# Patient Record
Sex: FEMALE | Race: WHITE | NOT HISPANIC OR LATINO | Employment: FULL TIME | URBAN - METROPOLITAN AREA
[De-identification: names, ages, dates, MRNs, and addresses within clinical notes are randomized per-mention and may not be internally consistent; named-entity substitution may affect disease eponyms.]

---

## 2023-10-16 PROBLEM — E66.812 CLASS 2 OBESITY WITH BODY MASS INDEX (BMI) OF 35.0 TO 35.9 IN ADULT: Status: ACTIVE | Noted: 2023-10-16

## 2024-07-18 PROBLEM — E66.812 CLASS 2 OBESITY WITH BODY MASS INDEX (BMI) OF 35.0 TO 35.9 IN ADULT: Status: RESOLVED | Noted: 2023-10-16 | Resolved: 2024-07-18

## 2024-07-18 PROBLEM — N97.0 INFERTILITY ASSOCIATED WITH ANOVULATION: Status: ACTIVE | Noted: 2024-07-18

## 2024-07-18 PROBLEM — E66.9 CLASS 2 OBESITY WITH BODY MASS INDEX (BMI) OF 35.0 TO 35.9 IN ADULT: Status: RESOLVED | Noted: 2023-10-16 | Resolved: 2024-07-18

## 2024-07-18 PROBLEM — O99.019 ANEMIA IN PREGNANCY (HHS-HCC): Status: RESOLVED | Noted: 2023-10-16 | Resolved: 2024-07-18

## 2024-07-18 PROBLEM — E28.2 POLYCYSTIC OVARIAN SYNDROME: Status: ACTIVE | Noted: 2024-07-18

## 2024-09-17 ASSESSMENT — LIFESTYLE VARIABLES
TOBACCO_USE: NO
HISTORY_ALCOHOL_USE: NO

## 2024-09-17 NOTE — PROGRESS NOTES
Visit Type: In Person    NEW FERTILITY PATIENT VISIT    Referred by: Ascencion Bravo MD  Accompanied today by:        Kristi Jack is a 28 y.o.  female who presents with concerns regarding PCOS (diagnosed by oligomenorrhea and hirsutism). Reports that she was diagnosed last year. She was referred to endocrinology, but did not follow up.  Had persistent bleeding in  and early July, prior to that, period was in November. Periods are very spaced out, has gone up to 9 months without a period.      She has currently been TTC for the past two years.  Has never been treated specifically for PCOS.      Have you had any concerns about your fertility treatments so far? No treatments done  What are you goals for today's visit? Get answers and a plan  What causes of infertility have been identified on your workup so far? PCOS  Past Infertility Treatments: No  Please summarize your fertility treatments to date.     As far as you are aware, do you have insurance coverage for fertility diagnostic testing and/or fertility treatments? Yes    PRIOR EVALUATION / TREATMENT    Prior Labs  Lab Results    Date Done      AMH: No results found for requested labs within last 1825 days. No results found for requested labs within last 1825 days.   TSH: 3.07 (Ref range: 0.44 - 3.98 mIU/L) 10/17/2023   PRL: 5.2 (Ref range: 3.0 - 20.0 ug/L) 10/17/2023   Testosterone: Free 6.1, total 38 10/17/2023   DHEAS: 64 (L; Ref range: 65 - 395 ug/dL) 10/17/2023   FSH: 7.3 (Ref range: IU/L) 10/17/2023   HgbA1c: 4.8 (Ref range: see below %) 10/17/2023   Hepatitis B surface antigen: NONREACTIVE (Ref range: NONREACTIVE) 2020   Hepatitis C antibody: NONREACTIVE (Ref range: NONREACTIVE) 2020   HIV ½ Antigen Antibody screen with reflex: NONREACTIVE (Ref range: NONREACTIVE) 2020   Syphilis screening with reflex: No results found for requested labs within last 1825 days. 2021   GC: NEGATIVE (Ref range: Negative)  2020   CT: NEGATIVE (Ref range: Negative) 2020   Type and Screen: O 2021   Rh: POS 2021   Rubella: Equivocal (Ref range: ) 2020   Creatinine: 0.77 (Ref range: 0.50 - 1.05 mg/dL) 2021   AST:15 (Ref range: 9 - 39 U/L) 2021   ALT:10 (Ref range: 7 - 45 U/L): 2021   Relationship Status:      Have you ever been pregnant? Yes  How many times have you been pregnant? 1  Have you ever had a miscarriage? No  How many times have you had a miscarriage?       OB Hx   - 2021, spontaneous conception, full term, IOL at 38.5wga for concern for macrosomia, c/b shoulder dystocia (9 lbs, 10oz) and postpartum hemorrhage managed medically and treated with 2 units PRBCs; also gestational hypertension; discontinued breastfeeding 2022    GYN HISTORY   Have you ever been diagnosed with a sexually transmitted disease? No  Please select all that are applicable:    Have you ever had Pelvic Inflammatory Disease? No  Have you had an abnormal PAP smear? No  Date & Result of last PAP smear: 2020 NIL  Have you ever had an abnormal Mammogram? No  Date & result of your last mammogram:  never  Do you have pelvic pain? No  How many times per week do you have intercourse? 0-3  Do you have pain with intercourse? Deep penetration, Yes  Do you use lubricants with intercourse?    Do you have pain with bowel movements?Especially when having menses, Yes  Do you have pain with a full bladder? No    MENSTRUAL HISTORY  LMP: 2024  Menarche: I dont remember. Maybe 10 or 12 years old?  Contraception:  OCPs age 18-24  Cycle length:  Irregular  Describe your bleeding: Light usually, most recently Heavy  Dysmenorrhea: Yes    ENDOCRINE/INFERTILITY HISTORY  Duration of infertility: 1-5 years  Coital Activity/week: 0-3  Nipple Discharge: No  Vision changes: No  Headaches: Migraines without auras around menses Yes  Excess hair growth: Upper lip, eyebrows, Yes  Excessive hair loss: No  Acne: Yes  Oily skin:  Yes  Recent weight change  Weight gain: 9168-2280 gained 40lbs Yes  Weight loss: No  Exercise more than 3 times a week: Yes    PMH  Asthma    MEDICATIONS  Current Outpatient Medications on File Prior to Visit   Medication Sig Dispense Refill    ascorbic acid (Vitamin C) 500 mg ER capsule Take 1 capsule (500 mg) by mouth once daily.      cetirizine (ZyrTEC) 10 mg tablet ZyrTEC Allergy 10 MG Oral Tablet   Refills: 0        Start : 2020   Active      INOSITOL ORAL Take 1,000 mg by mouth once daily.      prenatal vitamin, iron-folic, (Prenatal) 27 mg iron-800 mcg folic acid tablet Take 1 tablet by mouth once daily.      saccharomyces boulardii (Florastor) 250 mg capsule Take 1 capsule (250 mg) by mouth once daily.       No current facility-administered medications on file prior to visit.      PSH  Past Surgical History:   Procedure Laterality Date    HUMERUS FRACTURE SURGERY Right 2023        PSYCH HISTORY  Have you ever been diagnosed with a mental health Issue? Yes, previously on wellbutrin but discontinued and doing well  Have you ever been hospitalized for a mental health disorder? No     SOCIAL HISTORY  Social History     Tobacco Use    Smoking status: Never     Passive exposure: Never    Smokeless tobacco: Never   Vaping Use    Vaping status: Never Used   Substance Use Topics    Alcohol use: Not Currently    Drug use: Never     Occupation:   Have you ever been incarcerated? No  Do you have a history of domestic violence? No  Do you feel safe at home? Yes  Do you have a history of any negative sexual experience such as incest or rape? No    PARTNER HISTORY  Partner Name: Scooter Jack  Partner : [id number]  Partner email:  [email or web address]  Occupation:    Prior fertility history:  no  PMH:  no  PSH:  no  Smoking:No  Alcohol Use: No  Drug Use: No  Medications:  none  Injuries: No  STD: No  Please select all that are applicable:    SA: No  SA Results:      FAMILY  "HISTORY  Family History   Problem Relation Name Age of Onset    Liver disease Father     Father with alcohol/substance use    CANCER HISTORY    Breast:  no  Ovarian:  no  Colon:  yes, great uncle  Endometrial:  no    FAMILY VTE HISTORY  Family History of Blood Clots:  no    GENETIC HISTORY  Ethnic Background  Patient: White  Partner: White  Genetic Disease in Family  Patient: No  Partner: No  Birth Defects in Family  Patient: No  Partner: yes, sister with heart murmur, doing well  Genetic screening performed previously:  no     BMI:   BMI Readings from Last 1 Encounters:   24 41.12 kg/m²     VITALS:  /78   Pulse 75   Ht 1.6 m (5' 3\")   Wt 105 kg (232 lb 2 oz)   LMP 2024   BMI 41.12 kg/m²     ASSESSMENT   28 y.o.  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  Partner SA: No Assessment    COUNSELING  We discussed causes of infertility including hormonal, egg quality issues, structural problems such as endometriosis, adhesions, or tubal problems, uterine factors such as polyps or fibroids, and sperm issues. Reviewed evaluation of such as well. We discussed various methods for achieving pregnancy in some detail including, ovulation induction, insemination, superovulation and IVF.    We discussed diagnosis of PCOS and implications for fertility and long-term health including risks of endometrial hyperplasia, obesity, diabetes and cardiovascular disease. Discussed diet and exercise are first-line treatments for PCOS. Medical management may be indicated, particularly for glucose intolerance if that is found in testing. Ovulation induction is the primary treatment for fertility.  Discussed the importance of diet and exercise as first-line treatments for PCOS and particularly the importance of a low-glycemic index diet that emphasizes whole grains, vegetables and fruits, and " protein sources while minimizing sugar and processed foods. Discussed that even a small amount of weight loss can have an effect on the symptoms of PCOS and is important for long-term health outcomes.      Routine Testing  Fertility Center  STDs Within 1 year   Genetic carrier Waiver/Completed   T&S Within 1 year   AMH Within 1 year   TSH Within 1 year   Rubella/Varicella Within 5 years     PLAN  Orders Placed This Encounter   Procedures    Hysterosalpingogram (HSG)    US pelvis transvaginal    FL hysterosalpingogram    Hemoglobin A1C    17-Hydroxyprogesterone    TSH with reflex to Free T4 if abnormal    Antimullerian Hormone (Amh)    Progesterone    Type And Screen    Rubella Antibody, Igg    Varicella Zoster Antibody, Igg    Hepatitis B surface antigen    Hepatitis C Antibody    HIV 1/2 Antigen/Antibody Screen with Reflex to Confirmation    Syphilis Screen with Reflex    C. Trachomatis / N. Gonorrhoeae, Amplified Detection    POCT pregnancy, urine manually resulted   Obtain above labs and imaging along with partner SA, likely PCOS and tentative plan will be OI/TIC: letrozole 5 mg on CD3-7 with OPKs and TIC x3    GENETIC SCREENING PATIENT  Declines at this time    PARTNER  Yes Semen Analysis: Ordered  NA    FOLLOW UP   Consults:  offered weight management consult, patient will consider and discuss at follow up visit  Chart to primary nurse for care coordination and patient check list/education  Enroll in Engaged MD  Take prenatal vitamins, vitamin D 2000 IUs daily  Discussed that pap and mammogram must be updated per ACOG guidelines before treatment can begin  Discussed that treatment cannot proceed until checklist items are complete   6 week follow up with Any provider  Additional testing for BMI < 18 or > 40: No-will discuss at follow up  Sperm Donor:      MD Completion:  Ectopic Risk: No  Medically Complex: No    Fertility Plan Update: Obtain above labs and imaging along with partner SA, likely PCOS and  tentative plan will be OI/TIC: letrozole 5 mg on CD3-7 with OPKs and TIC x3    Mayte Eli  09/18/2024  11:07 AM

## 2024-09-18 ENCOUNTER — CONSULT (OUTPATIENT)
Dept: ENDOCRINOLOGY | Facility: CLINIC | Age: 29
End: 2024-09-18
Payer: COMMERCIAL

## 2024-09-18 VITALS
DIASTOLIC BLOOD PRESSURE: 78 MMHG | BODY MASS INDEX: 41.13 KG/M2 | HEART RATE: 75 BPM | HEIGHT: 63 IN | WEIGHT: 232.13 LBS | SYSTOLIC BLOOD PRESSURE: 120 MMHG

## 2024-09-18 DIAGNOSIS — Z01.812 ENCOUNTER FOR PREPROCEDURAL LABORATORY EXAMINATION: ICD-10-CM

## 2024-09-18 DIAGNOSIS — N91.3 PRIMARY OLIGOMENORRHEA: ICD-10-CM

## 2024-09-18 DIAGNOSIS — N97.0 INFERTILITY ASSOCIATED WITH ANOVULATION: ICD-10-CM

## 2024-09-18 DIAGNOSIS — Z13.29 SCREENING FOR THYROID DISORDER: Primary | ICD-10-CM

## 2024-09-18 DIAGNOSIS — Z13.1 SCREENING FOR DIABETES MELLITUS: ICD-10-CM

## 2024-09-18 DIAGNOSIS — Z11.59 ENCOUNTER FOR SCREENING FOR OTHER VIRAL DISEASES: ICD-10-CM

## 2024-09-18 DIAGNOSIS — Z11.3 SCREENING FOR STDS (SEXUALLY TRANSMITTED DISEASES): ICD-10-CM

## 2024-09-18 DIAGNOSIS — E28.2 POLYCYSTIC OVARIAN SYNDROME: ICD-10-CM

## 2024-09-18 DIAGNOSIS — Z31.41 FERTILITY TESTING: ICD-10-CM

## 2024-09-18 DIAGNOSIS — Z01.83 ENCOUNTER FOR RH BLOOD TYPING: ICD-10-CM

## 2024-09-18 PROCEDURE — 99214 OFFICE O/P EST MOD 30 MIN: CPT | Performed by: STUDENT IN AN ORGANIZED HEALTH CARE EDUCATION/TRAINING PROGRAM

## 2024-09-18 ASSESSMENT — PATIENT HEALTH QUESTIONNAIRE - PHQ9
SUM OF ALL RESPONSES TO PHQ9 QUESTIONS 1 AND 2: 1
2. FEELING DOWN, DEPRESSED OR HOPELESS: NOT AT ALL
10. IF YOU CHECKED OFF ANY PROBLEMS, HOW DIFFICULT HAVE THESE PROBLEMS MADE IT FOR YOU TO DO YOUR WORK, TAKE CARE OF THINGS AT HOME, OR GET ALONG WITH OTHER PEOPLE: NOT DIFFICULT AT ALL
1. LITTLE INTEREST OR PLEASURE IN DOING THINGS: SEVERAL DAYS

## 2024-09-18 ASSESSMENT — COLUMBIA-SUICIDE SEVERITY RATING SCALE - C-SSRS
1. IN THE PAST MONTH, HAVE YOU WISHED YOU WERE DEAD OR WISHED YOU COULD GO TO SLEEP AND NOT WAKE UP?: NO
6. HAVE YOU EVER DONE ANYTHING, STARTED TO DO ANYTHING, OR PREPARED TO DO ANYTHING TO END YOUR LIFE?: NO
2. HAVE YOU ACTUALLY HAD ANY THOUGHTS OF KILLING YOURSELF?: NO

## 2024-09-18 ASSESSMENT — PAIN SCALES - GENERAL: PAINLEVEL: 0-NO PAIN

## 2024-09-23 ENCOUNTER — TELEPHONE (OUTPATIENT)
Dept: ENDOCRINOLOGY | Facility: CLINIC | Age: 29
End: 2024-09-23
Payer: COMMERCIAL

## 2024-09-23 NOTE — TELEPHONE ENCOUNTER
Called the patient she verified her name and date of birth I advised she can  a specimen cup for semen analysis in our office or get an urine cup at any pharmacy Last period was aug 5 I advised she can get labs and still no period then call to discuss results I advised hsg can not be done without a period I advised it is done day 5-11 of her cycle     CHARBEL THOMAS 09/23/24 11:42 AM

## 2024-09-24 ENCOUNTER — LAB (OUTPATIENT)
Dept: LAB | Facility: LAB | Age: 29
End: 2024-09-24
Payer: COMMERCIAL

## 2024-09-24 DIAGNOSIS — Z13.29 SCREENING FOR THYROID DISORDER: ICD-10-CM

## 2024-09-24 DIAGNOSIS — N91.3 PRIMARY OLIGOMENORRHEA: ICD-10-CM

## 2024-09-24 DIAGNOSIS — Z31.41 FERTILITY TESTING: ICD-10-CM

## 2024-09-24 DIAGNOSIS — Z13.1 SCREENING FOR DIABETES MELLITUS: ICD-10-CM

## 2024-09-24 DIAGNOSIS — E28.2 POLYCYSTIC OVARIAN SYNDROME: ICD-10-CM

## 2024-09-24 DIAGNOSIS — Z11.3 SCREENING FOR STDS (SEXUALLY TRANSMITTED DISEASES): ICD-10-CM

## 2024-09-24 DIAGNOSIS — Z11.59 ENCOUNTER FOR SCREENING FOR OTHER VIRAL DISEASES: ICD-10-CM

## 2024-09-24 DIAGNOSIS — Z01.83 ENCOUNTER FOR RH BLOOD TYPING: ICD-10-CM

## 2024-09-24 LAB
ABO GROUP (TYPE) IN BLOOD: NORMAL
ANTIBODY SCREEN: NORMAL
EST. AVERAGE GLUCOSE BLD GHB EST-MCNC: 105 MG/DL
HBA1C MFR BLD: 5.3 %
HBV SURFACE AG SERPL QL IA: NONREACTIVE
HCV AB SER QL: NONREACTIVE
HIV 1+2 AB+HIV1 P24 AG SERPL QL IA: NONREACTIVE
PROGEST SERPL-MCNC: 0.3 NG/ML
RH FACTOR (ANTIGEN D): NORMAL
RUBV IGG SERPL IA-ACNC: 0.7 IA
RUBV IGG SERPL QL IA: NEGATIVE
TREPONEMA PALLIDUM IGG+IGM AB [PRESENCE] IN SERUM OR PLASMA BY IMMUNOASSAY: NONREACTIVE
TSH SERPL-ACNC: 2.49 MIU/L (ref 0.44–3.98)
VARICELLA ZOSTER IGG INDEX: <0.2 IA
VZV IGG SER QL IA: NEGATIVE

## 2024-09-24 PROCEDURE — 86787 VARICELLA-ZOSTER ANTIBODY: CPT

## 2024-09-24 PROCEDURE — 87340 HEPATITIS B SURFACE AG IA: CPT

## 2024-09-24 PROCEDURE — 87491 CHLMYD TRACH DNA AMP PROBE: CPT

## 2024-09-24 PROCEDURE — 84443 ASSAY THYROID STIM HORMONE: CPT

## 2024-09-24 PROCEDURE — 84144 ASSAY OF PROGESTERONE: CPT

## 2024-09-24 PROCEDURE — 86317 IMMUNOASSAY INFECTIOUS AGENT: CPT

## 2024-09-24 PROCEDURE — 87389 HIV-1 AG W/HIV-1&-2 AB AG IA: CPT

## 2024-09-24 PROCEDURE — 36415 COLL VENOUS BLD VENIPUNCTURE: CPT

## 2024-09-24 PROCEDURE — 86850 RBC ANTIBODY SCREEN: CPT

## 2024-09-24 PROCEDURE — 86900 BLOOD TYPING SEROLOGIC ABO: CPT

## 2024-09-24 PROCEDURE — 86780 TREPONEMA PALLIDUM: CPT

## 2024-09-24 PROCEDURE — 83036 HEMOGLOBIN GLYCOSYLATED A1C: CPT

## 2024-09-24 PROCEDURE — 86803 HEPATITIS C AB TEST: CPT

## 2024-09-24 PROCEDURE — 87591 N.GONORRHOEAE DNA AMP PROB: CPT

## 2024-09-24 PROCEDURE — 83498 ASY HYDROXYPROGESTERONE 17-D: CPT

## 2024-09-24 PROCEDURE — 83516 IMMUNOASSAY NONANTIBODY: CPT

## 2024-09-24 PROCEDURE — 86901 BLOOD TYPING SEROLOGIC RH(D): CPT

## 2024-09-25 LAB
C TRACH RRNA SPEC QL NAA+PROBE: NEGATIVE
N GONORRHOEA DNA SPEC QL PROBE+SIG AMP: NEGATIVE

## 2024-09-28 LAB
17OHP SERPL-MCNC: 49.16 NG/DL
MIS SERPL-MCNC: 10.54 NG/ML (ref 0.4–16.02)

## 2024-10-08 ENCOUNTER — LAB (OUTPATIENT)
Dept: LAB | Facility: LAB | Age: 29
End: 2024-10-08
Payer: COMMERCIAL

## 2024-10-08 ENCOUNTER — TELEPHONE (OUTPATIENT)
Dept: ENDOCRINOLOGY | Facility: CLINIC | Age: 29
End: 2024-10-08
Payer: COMMERCIAL

## 2024-10-08 DIAGNOSIS — N97.9 FEMALE INFERTILITY: ICD-10-CM

## 2024-10-08 DIAGNOSIS — Z32.00 PREGNANCY EXAMINATION OR TEST, PREGNANCY UNCONFIRMED: ICD-10-CM

## 2024-10-08 LAB
B-HCG SERPL-ACNC: <3 MIU/ML
PROGEST SERPL-MCNC: <0.3 NG/ML

## 2024-10-08 PROCEDURE — 84702 CHORIONIC GONADOTROPIN TEST: CPT

## 2024-10-08 PROCEDURE — 84144 ASSAY OF PROGESTERONE: CPT

## 2024-10-08 PROCEDURE — 36415 COLL VENOUS BLD VENIPUNCTURE: CPT

## 2024-10-08 RX ORDER — MEDROXYPROGESTERONE ACETATE 10 MG/1
10 TABLET ORAL DAILY
Qty: 10 TABLET | Refills: 0 | Status: SHIPPED | OUTPATIENT
Start: 2024-10-08 | End: 2025-10-08

## 2024-10-08 NOTE — TELEPHONE ENCOUNTER
Returned patient's call. Patient stated LMP: 8/5/24, and her cycles can typically go Q 3-9 months. Patient met with Dr. Eli on 9/18 and is planning to get set up for OI/TIC cycles and will need to schedule HSG and pelvic US with withdrawal bleed.  Patient placed in noon huddle for today to review p4 and bhcg for possible provera start.     Ailyn De Leon 10/08/24 10:16 AM    HUDDLE PROVIDER NOTE- PROGESTERONE CHECK  Ultrasound and/or labs reviewed at huddle.   Progesterone <0.3, Negative HCG    Ovulatory progesterone  no    Next steps:  Plan for provera 10mg x 10 days.   Jocelyn Mathews  10/08/2024  2:41 PM

## 2024-10-08 NOTE — PROGRESS NOTES
Patient called today with LMP last on 8/5/24 and cycle length Q3-9 months. Patient had labs checked and p4 and bhcg negative (see Jocelyn's note from telephone call today). Plan to start provera today 10mg x 10 days and patient to call with withdrawal bleed to schedule HSG and pelvic.     Ailyn De Leon 10/08/24 2:46 PM

## 2024-10-08 NOTE — TELEPHONE ENCOUNTER
Reason for call: Patient never gets a cycle and was advised by Dr Eli to call to get a Rx to  start her cycle. She already did her labs.  Notes:

## 2024-11-01 ENCOUNTER — OFFICE VISIT (OUTPATIENT)
Dept: ENDOCRINOLOGY | Facility: CLINIC | Age: 29
End: 2024-11-01
Payer: COMMERCIAL

## 2024-11-01 VITALS
HEART RATE: 71 BPM | HEIGHT: 63 IN | BODY MASS INDEX: 41.66 KG/M2 | SYSTOLIC BLOOD PRESSURE: 117 MMHG | WEIGHT: 235.13 LBS | DIASTOLIC BLOOD PRESSURE: 80 MMHG

## 2024-11-01 DIAGNOSIS — E28.2 PCOS (POLYCYSTIC OVARIAN SYNDROME): Primary | ICD-10-CM

## 2024-11-01 PROCEDURE — 3008F BODY MASS INDEX DOCD: CPT | Performed by: STUDENT IN AN ORGANIZED HEALTH CARE EDUCATION/TRAINING PROGRAM

## 2024-11-01 PROCEDURE — 99214 OFFICE O/P EST MOD 30 MIN: CPT | Performed by: STUDENT IN AN ORGANIZED HEALTH CARE EDUCATION/TRAINING PROGRAM

## 2024-11-01 PROCEDURE — 1036F TOBACCO NON-USER: CPT | Performed by: STUDENT IN AN ORGANIZED HEALTH CARE EDUCATION/TRAINING PROGRAM

## 2024-11-01 ASSESSMENT — PAIN SCALES - GENERAL: PAINLEVEL_OUTOF10: 0-NO PAIN

## 2024-11-02 ENCOUNTER — TELEPHONE (OUTPATIENT)
Dept: ENDOCRINOLOGY | Facility: CLINIC | Age: 29
End: 2024-11-02
Payer: COMMERCIAL

## 2024-11-25 ENCOUNTER — TELEPHONE (OUTPATIENT)
Dept: ENDOCRINOLOGY | Facility: CLINIC | Age: 29
End: 2024-11-25
Payer: COMMERCIAL

## 2024-11-25 NOTE — TELEPHONE ENCOUNTER
Reason for call: Medication request  Medication requested: Provera and Letrozole  Notes: Pt stated she was supposed to start her cycle Friday 11/22 and was instructed to call for medication if she did not. Pt would like the scripts sent to [facility] in [place].

## 2024-11-25 NOTE — TELEPHONE ENCOUNTER
Called the patient she verified her name and date of birth Patient will call back    CHARBEL THOMAS 11/25/24 12:18 PM

## 2024-11-25 NOTE — TELEPHONE ENCOUNTER
Called the patient she verified her name and date of birth No period since Oct 25 No fertility meds taken I advised to wait until Friday if no period call the office Patient verbalized an understanding    CHARBEL THOMAS 11/25/24 2:04 PM

## 2024-12-02 ENCOUNTER — LAB (OUTPATIENT)
Dept: LAB | Facility: LAB | Age: 29
End: 2024-12-02
Payer: COMMERCIAL

## 2024-12-02 DIAGNOSIS — N91.2 AMENORRHEA: Primary | ICD-10-CM

## 2024-12-02 DIAGNOSIS — N91.2 AMENORRHEA: ICD-10-CM

## 2024-12-02 LAB — B-HCG SERPL-ACNC: <3 MIU/ML

## 2024-12-02 PROCEDURE — 84144 ASSAY OF PROGESTERONE: CPT

## 2024-12-02 PROCEDURE — 36415 COLL VENOUS BLD VENIPUNCTURE: CPT

## 2024-12-02 PROCEDURE — 84702 CHORIONIC GONADOTROPIN TEST: CPT

## 2024-12-02 NOTE — TELEPHONE ENCOUNTER
Reason for call:   Notes: Patient hasn't started cycle yet and would like to speak with a nurse    20-Nov-2023 05:00

## 2024-12-02 NOTE — TELEPHONE ENCOUNTER
Returned patient call. Patient LMP = 10/25/2024. HCG and P4 labs pended to ANDREA Banks. Patient will like to start provera and Letrozole. Informed patient she would not receive both medications at the same time. Patient instructed to call CD1 in order to receive Letrozole. Patient verbalized understand, all questions answered at this time.  viktor grijalva 12/02/24 11:38 AM

## 2024-12-04 LAB — PROGEST SERPL-MCNC: <0.3 NG/ML

## 2024-12-04 NOTE — PROGRESS NOTES
Patient presents today for Patient LMP = 10/25/2024. HCG and P4 with hopes of starting provera.  viktor grijalva 12/04/24 12:02 PM    As of 3:47 pm patient labs have not resulted. Contacted  lab, stated results would be in in 15 minutes @ 3:00 pm. Patient added to noon huddle for 12/05/2024.  viktor grijalva 12/04/24 3:48 PM

## 2024-12-05 DIAGNOSIS — N91.2 AMENORRHEA: Primary | ICD-10-CM

## 2024-12-05 RX ORDER — MEDROXYPROGESTERONE ACETATE 10 MG/1
10 TABLET ORAL DAILY
Qty: 10 TABLET | Refills: 0 | Status: SHIPPED | OUTPATIENT
Start: 2024-12-05 | End: 2024-12-15

## 2024-12-05 NOTE — PROGRESS NOTES
Patient presents today for Patient LMP = 10/25/2024. HCG and P4 with hopes of starting provera. Current on CD 42  Component      Latest Ref Rng 12/2/2024   HCG, Beta-Quantitative      <5 mIU/mL <3    Progesterone      ng/mL <0.3    viktor grijalva 12/05/24 7:46 AM    HUDDLE PROVIDER NOTE- PROGESTERONE CHECK  Ultrasound and/or labs reviewed at huddle.   Results for orders placed or performed in visit on 12/02/24 (from the past 96 hours)   (Lab Collect) Human Chorionic Gonadotropin, Serum Quantitative   Result Value Ref Range    HCG, Beta-Quantitative <3 <5 mIU/mL   (Lab Collect) Progesterone   Result Value Ref Range    Progesterone <0.3 ng/mL       Ovulatory progesterone  no    Next steps:  Plan for provera 10mg x 10 days.  Jocelyn Mathews  12/05/2024  8:58 AM    Spoke with patient about above plan.  Patient verbalized understanding.  viktor grijalva 12/05/24 10:51 AM

## 2024-12-16 ENCOUNTER — TELEPHONE (OUTPATIENT)
Dept: ENDOCRINOLOGY | Facility: CLINIC | Age: 29
End: 2024-12-16
Payer: COMMERCIAL

## 2024-12-16 NOTE — TELEPHONE ENCOUNTER
Reason for call: reporting start of cycle  LMP: 12/15/24  Treatment type: timed intercourse  Note:

## 2024-12-16 NOTE — TELEPHONE ENCOUNTER
Returned patient call. Patient transferred to  to schedule vbpv.  viktor grijalva 12/16/24 2:45 PM

## 2024-12-17 ENCOUNTER — TELEMEDICINE (OUTPATIENT)
Dept: ENDOCRINOLOGY | Facility: CLINIC | Age: 29
End: 2024-12-17
Payer: COMMERCIAL

## 2024-12-17 VITALS — BODY MASS INDEX: 41.82 KG/M2 | WEIGHT: 236 LBS | HEIGHT: 63 IN

## 2024-12-17 DIAGNOSIS — N97.9 FEMALE INFERTILITY: Primary | ICD-10-CM

## 2024-12-17 DIAGNOSIS — Z31.41 FERTILITY TESTING: ICD-10-CM

## 2024-12-17 PROCEDURE — 3008F BODY MASS INDEX DOCD: CPT | Performed by: NURSE PRACTITIONER

## 2024-12-17 PROCEDURE — 99213 OFFICE O/P EST LOW 20 MIN: CPT | Performed by: NURSE PRACTITIONER

## 2024-12-17 PROCEDURE — 1036F TOBACCO NON-USER: CPT | Performed by: NURSE PRACTITIONER

## 2024-12-17 RX ORDER — LETROZOLE 2.5 MG/1
5 TABLET, FILM COATED ORAL DAILY
Qty: 10 TABLET | Refills: 0 | Status: SHIPPED | OUTPATIENT
Start: 2024-12-17 | End: 2025-03-17

## 2024-12-17 ASSESSMENT — COLUMBIA-SUICIDE SEVERITY RATING SCALE - C-SSRS
2. HAVE YOU ACTUALLY HAD ANY THOUGHTS OF KILLING YOURSELF?: NO
6. HAVE YOU EVER DONE ANYTHING, STARTED TO DO ANYTHING, OR PREPARED TO DO ANYTHING TO END YOUR LIFE?: NO
1. IN THE PAST MONTH, HAVE YOU WISHED YOU WERE DEAD OR WISHED YOU COULD GO TO SLEEP AND NOT WAKE UP?: NO

## 2024-12-17 ASSESSMENT — PAIN SCALES - GENERAL: PAINLEVEL_OUTOF10: 0-NO PAIN

## 2024-12-17 ASSESSMENT — PATIENT HEALTH QUESTIONNAIRE - PHQ9
2. FEELING DOWN, DEPRESSED OR HOPELESS: NOT AT ALL
SUM OF ALL RESPONSES TO PHQ9 QUESTIONS 1 AND 2: 0
1. LITTLE INTEREST OR PLEASURE IN DOING THINGS: NOT AT ALL

## 2024-12-17 NOTE — PROGRESS NOTES
Boarding Pass Oral TIC/IUI     Age: 28 y.o.    Provider: Mayte Eli MD  Primary RN: Barbara  Reasons for Treatment: Polycystic Ovarian Syndrome  Last BMI  24 : 41.65 kg/m²         Medical History        Past Medical History:   Diagnosis Date    Other allergy status, other than to drugs and biological substances       Environmental allergies    Pain in unspecified joint 10/09/2018     Arthralgia of multiple joints    Personal history of other diseases of the respiratory system       Personal history of asthma            Date Done Consultation Results/Comments   24 Medication Protocol    Fertility Plan Update:: Letrozole 5 mg with opk and TIC x3  P4 first cycle  Trigger plan:  none  Adjuncts:  none  Notes: Follow up in 4 months to discuss repeat SA HSG and TVUS     Procedure Order Placed []     Date Done Female Labs Results/Comments   2024 T&S (Q 1 Year) ABO: O  Rh: POS  Antibody: NEG      2024 Hep B sAg Nonreactive   2024 Hep C AB Nonreactive   2024 HIV Nonreactive   2024 Syphilis Nonreactive   2024 GC/CT GC: Negative  CT: Negative   2024 Rubella (Q 5 Years) Negative- Patient decline with review of risks today -PIPPA Varela     2024 Varicella (Q 5 Years) Negative- patient declines aware of potential risks and recommendation.    PIPPA Varela       Carrier Screening Myriad 2bP:   Declined waiver signed   N/A   Date Done Male Labs (required if IUI)    Results/Comments  Scooter Jack  MRN [id number]     Hep B sAg N/a     Hep C AB  N/a     HIV N/a     Syphilis N/a     GC/CT GC: n/a  CT: n/a     Carrier Screening    N/a  N/A     Semen Analysis  Volume(mL): 2.8  Count(million): 2.836  Motility(%): 50  Motile Count(million): 1.413   Date Done Miscellaneous Results/Comments                      MD Completion:  Ectopic Risk: No  Medically Complex: No    LMP 12/15/24    Letrozole: 2 tablet(s) a day, cycle days 3-7   Note: 1st day of full flow is  cycle day 1      Have sexual intercourse approximately every other day for 1 week beginning cycle day 11  You don't need temperature charts or LH predictor kits because normal cycle lengths indicate ovulatory cycles.  If you are planning inseminations, you will use LH predictor kits for timing    If normal 28 - 32 day cycle, repeat above for a total of 3 cycles    Call office if:   pregnant  cycles longer than 35 days   not pregnant after 3 regular cycles     Side effects of Letrozole may include:  acne  headache   hot flushes  leg cramps   nausea     If side effects are severe, alternative medications may be available. Letrozole has a 8-10% chance of twins and less than 1 % chance of triplets.     Progesterone:  May be given to bring on a period if you have not had a period after 40 days and a home pregnancy test is negative. Continue to take Progesterone if you have light bleeding. Expect to bleed within 2 weeks of finishing Progesterone    Letrozole is used for ovulation inductions. Letrozole is not a FDA approved medication for infertility treatment and may be associated with an increased for of congenital defects, although this was not found in a recent large study of patient taking letrozole for unexplained infertility. Letrozole is teratogenic therefore a urine pregnancy test should be taken prior to taking the medication.      Boarding pass reviewed with:  Protocol: Letrozole 5mg with cd 21 progesterone first cycle to confirm ovulation.Plan on 3 cycles and then FUV. Plan on progesterone on 1/3 first cycle.   Sperm: partner  Testing up to date. yes  Procedure order placed. N/a    Boarding pass approved for 3 cycles/until 9/25    Jocelyn CALDWELL Reid 12/17/24 1:24 PM    Addended Boarding Pass: Boarding Pass reviewed with ROSIE Barajas RN and is complete. patient is cleared for fertility treatment: Letrozole 7.5 mg days 3-7/TIC x 2 more cycles. Meera Bautista, BIANCA 02/07/25 3:27 PM

## 2025-01-03 ENCOUNTER — LAB (OUTPATIENT)
Dept: LAB | Facility: LAB | Age: 30
End: 2025-01-03
Payer: COMMERCIAL

## 2025-01-03 DIAGNOSIS — Z31.41 FERTILITY TESTING: ICD-10-CM

## 2025-01-03 LAB — PROGEST SERPL-MCNC: 0.4 NG/ML

## 2025-01-03 PROCEDURE — 84144 ASSAY OF PROGESTERONE: CPT

## 2025-01-03 NOTE — PROGRESS NOTES
29 year old patient of Mayte Eli MD  is here for current CD 20 ( lmp = 12/15/2024). Fertility Plan Update:: Letrozole 5 mg with opk and TIC with P4 first cycle.  viktor grijalva 01/03/25 8:23 AM      As of 11:00 am patient has not gone to lab. Contacted patient with a reminder call to have P4 draw. Detailed voicemail left for patient.  viktor grijalva 01/03/25 11:00 AM    As of 3:38 pm patient labs have not resulted. Message sent to  to add patient to noon huddle for Monday 01/06/2025.  viktor grijalva 01/03/25 3:38 PM

## 2025-01-06 DIAGNOSIS — N97.9 FEMALE INFERTILITY: ICD-10-CM

## 2025-01-06 RX ORDER — LETROZOLE 2.5 MG/1
7.5 TABLET, FILM COATED ORAL DAILY
Qty: 15 TABLET | Refills: 0 | Status: SHIPPED | OUTPATIENT
Start: 2025-01-06 | End: 2025-04-06

## 2025-01-06 NOTE — PROGRESS NOTES
29 year old patient of Mayte Eli MD  is here for current CD 20 ( lmp = 12/15/2024). Fertility Plan Update:: Letrozole 5 mg with opk and TIC with P4 first cycle.      Latest Reference Range & Units 01/03/25 11:45   Progesterone ng/mL 0.4   viktor grijalva 01/06/25 8:36 AM      HUDDLE PROVIDER NOTE- PROGESTERONE CHECK  [facility]   Results for orders placed or performed in visit on 01/03/25 (from the past 96 hours)   Progesterone   Result Value Ref Range    Progesterone 0.4 ng/mL       Ovulatory progesterone  no    Next steps:  Quick start letrozole 7.5mg x 5 days starting today. Plan to check a progesterone 2 weeks after last set of pills.   Jocelyn Mathews  01/06/2025  11:37 AM    Contacted patient about above plan. Letrozole 7.5 and P4 pended to ANDREA Banks. Patient verbalized understanding.  viktor grijalva 01/06/25 12:56 PM

## 2025-01-07 ENCOUNTER — TELEPHONE (OUTPATIENT)
Dept: ENDOCRINOLOGY | Facility: CLINIC | Age: 30
End: 2025-01-07
Payer: COMMERCIAL

## 2025-01-07 NOTE — TELEPHONE ENCOUNTER
Returned patient call. Left patient voicemail to return my call.  viktor grijalva 01/07/25 1:55 PM    Patient returned my call. Patient agreeable with plan. Patient will start step up 7.5 letrozole today for 5 days with a P4 lab draw on 01/23/2025 due to patient going out of town on the original date set. Message sent to  to change appointment on noon huddle to 01/23/2025.   viktor grijalva 01/07/25 2:18 PM

## 2025-01-07 NOTE — TELEPHONE ENCOUNTER
Reason for call: Patient spoke to a nurse yesterday got an Rx has some follow up questions. Please call her.  Notes:

## 2025-01-23 ENCOUNTER — LAB (OUTPATIENT)
Dept: LAB | Facility: LAB | Age: 30
End: 2025-01-23
Payer: COMMERCIAL

## 2025-01-23 DIAGNOSIS — N97.9 FEMALE INFERTILITY: ICD-10-CM

## 2025-01-23 LAB — PROGEST SERPL-MCNC: 4.1 NG/ML

## 2025-01-23 PROCEDURE — 84144 ASSAY OF PROGESTERONE: CPT

## 2025-01-23 NOTE — PROGRESS NOTES
"29 year old patient of Mayte Eli MD presents today for cd 19, patient will be out of town on cd21. Patient stepped up to 7.5 mg Letrozole on 01/07/2025. Fertility Plan Update:: Letrozole 7.5 mg with opk and TIC .  viktor grijalva 01/23/25 11:07 AM   Latest Reference Range & Units 01/23/25 09:07   Progesterone ng/mL 4.1     Monitoring patient, \"mock CD 19\" P4 Level for Step Up to Letrozole 7.5 mg/TIC cycle on 1-7-2025: P4 is ovulatory @ 4.1. Patient to call with menses or with + UPT. Plan to be communicated by LPN. Plan agreed upon by Dr. Eli. Meera Bautista, BIANCA 01/23/25 4:47 PM       Contacted patient with plan. Patient verbalized understanding.   viktor grijalva 01/23/25 3:41 PM    "

## 2025-02-07 ENCOUNTER — TELEPHONE (OUTPATIENT)
Dept: ENDOCRINOLOGY | Facility: CLINIC | Age: 30
End: 2025-02-07
Payer: COMMERCIAL

## 2025-02-07 DIAGNOSIS — N97.9 FEMALE INFERTILITY: ICD-10-CM

## 2025-02-07 RX ORDER — LETROZOLE 2.5 MG/1
7.5 TABLET, FILM COATED ORAL DAILY
Qty: 15 TABLET | Refills: 0 | Status: SHIPPED | OUTPATIENT
Start: 2025-02-07 | End: 2025-05-08

## 2025-02-07 NOTE — TELEPHONE ENCOUNTER
Patient called with menses for TIC cycle. LMP 2/7/25  Cycle #: 2  Medication: Letrozole 7.5  Ovulation: LH Kit  Sperm Source:  partner fresh  Approved donor sperm number: n/a  Luteal Support:  none  Additional Medications:  none  Boarding Pass signed off: Yes-12/17/24 with Jocelyn  IUI order pended: n/a-TIC  Additional Information: 1st cycle showed non-ovulatory p4 on CD21 with 5mg dose. Patient was stepped up to Letrozole 7.5mg. 7.5mg Letrozole order pended to provider for signing. Will request provider updates BP. Patient aware to take Letrozole starting CD 3 through CD 7 after negative UPT. Encouraged patient to call office with any questions or concerns.  Frida Barajas 02/07/25 2:30 PM

## 2025-02-07 NOTE — TELEPHONE ENCOUNTER
Reason for call: reporting start of cycle  LMP: 2/7/25  Treatment type: timed intercourse    Note:

## 2025-03-13 ENCOUNTER — TELEPHONE (OUTPATIENT)
Dept: ENDOCRINOLOGY | Facility: CLINIC | Age: 30
End: 2025-03-13

## 2025-03-13 DIAGNOSIS — Z32.01 POSITIVE URINE PREGNANCY TEST (HHS-HCC): Primary | ICD-10-CM

## 2025-03-13 NOTE — PROGRESS NOTES
Initial HCG: Patient called with + HPT  Patient's EVONNE Provider: Mayte Eli MD  Infertility dx: Polycystic Ovarian Syndrome  Achieved pregnancy by: Timed intercourse  Fertility medications used this cycle: letrozole  LMP: Patient's last menstrual period was 25  EGA based on (IUI date, ET ): LMP: 4w6d     Updated G/P with this pregnancy:   OB HX:                   OB History    Para Term  AB Living   1 1 1 0 0 1   SAB IAB Ectopic Multiple Live Births      0 0 0 0 1          # Outcome Date GA Lbr Shadi/2nd Weight Sex Type Anes PTL Lv   1 Term 21 38w0d     M Vag-Spont EPI N KATERINA      Complications: Shoulder Dystocia, Hemorrhage         Type & Screen: 2024  ABO: O  Rh: POS  Antibody: NEG  History of miscarriage: No  History of pelvic/abdominal surgeries: No  History of STDs/PID: No  Hx of ectopic: No  Hx of tubal disease/ blockage: No     Risk for ectopic: No        PNV: Yes  Vitamin D 2000 IUs: No  Luteal support: None  Additional medications: Vitamin C 500mg  Zyrtec 10mg  Inositol 1000mg ( recommend stopping with positive pregnancy test)     Labs ordered/Plan:   BhCG ordered. Team will reach out to patient with results and plan.   Discussed early pregnancy versus miscarriage versus ectopic. Precautions given.   Patient expresses understanding of plan and is agreeable.       Patient results are not back yet placed in noon huddle for tomorrow 3/14.

## 2025-03-13 NOTE — PROGRESS NOTES
Initial HCG: {EVONNE Initial Serum Quant:66549}  Patient's EVONNE Provider: Mayte Eli MD  Infertility dx: Polycystic Ovarian Syndrome  Achieved pregnancy by: Timed intercourse  Fertility medications used this cycle: letrozole  LMP: Patient's last menstrual period was 25  EGA based on (IUI date, ET ): LMP: 4w6d    Updated G/P with this pregnancy: ***  OB HX:  OB History    Para Term  AB Living   1 1 1 0 0 1   SAB IAB Ectopic Multiple Live Births   0 0 0 0 1      # Outcome Date GA Lbr Shadi/2nd Weight Sex Type Anes PTL Lv   1 Term 21 38w0d   M Vag-Spont EPI N KATERINA      Complications: Shoulder Dystocia, Hemorrhage       Type & Screen: 2024  ABO: O  Rh: POS  Antibody: NEG  History of miscarriage: No  History of pelvic/abdominal surgeries: No  History of STDs/PID: No  Hx of ectopic: No  Hx of tubal disease/ blockage: No    Risk for ectopic: No      PNV: Yes  Vitamin D 2000 IUs: No  Luteal support: None  Additional medications: Vitamin C 500mg  Zyrtec 10mg  Inositol 1000mg ( recommend stopping with positive pregnancy test)    Labs ordered/Plan:   BhCG ordered. Team will reach out to patient with results and plan.   Discussed early pregnancy versus miscarriage versus ectopic. Precautions given.   Patient expresses understanding of plan and is agreeable.    Candice Colon  2025  8:18 AM

## 2025-03-13 NOTE — TELEPHONE ENCOUNTER
Patient called for positive home pregnancy test. See noon huddle note for more details. Patient plans to go to outpatient lab for bHCG.      03/13/25 at 8:41 AM - Candice Colon LPN

## 2025-03-14 DIAGNOSIS — Z32.01 POSITIVE BLOOD PREGNANCY TEST (HHS-HCC): Primary | ICD-10-CM

## 2025-03-14 LAB — B-HCG SERPL-ACNC: 602 MIU/ML

## 2025-03-14 NOTE — PROGRESS NOTES
Initial HCG: Patient called with + HPT  Patient's EVONNE Provider: Mayte Eli MD  Infertility dx: Polycystic Ovarian Syndrome  Achieved pregnancy by: Timed intercourse  Fertility medications used this cycle: letrozole  LMP: Patient's last menstrual period was 25  EGA based on (IUI date, ET ): LMP: 4w6d     Updated G/P with this pregnancy:   OB HX:                                  OB History    Para Term  AB Living   1 1 1 0 0 1   SAB IAB Ectopic Multiple Live Births         0 0 0 0 1             # Outcome Date GA Lbr Shadi/2nd Weight Sex Type Anes PTL Lv   1 Term 21 38w0d     M Vag-Spont EPI N KATERINA      Complications: Shoulder Dystocia, Hemorrhage         Type & Screen: 2024  ABO: O  Rh: POS  Antibody: NEG  History of miscarriage: No  History of pelvic/abdominal surgeries: No  History of STDs/PID: No  Hx of ectopic: No  Hx of tubal disease/ blockage: No     Risk for ectopic: No        PNV: Yes  Vitamin D 2000 IUs: No  Luteal support: None  Additional medications: Vitamin C 500mg  Zyrtec 10mg  Inositol 1000mg ( recommend stopping with positive pregnancy test)     Labs ordered/Plan:   BhCG ordered. Team will reach out to patient with results and plan.   Discussed early pregnancy versus miscarriage versus ectopic. Precautions given.   Patient expresses understanding of plan and is agreeable.       Component      Latest Ref Rng 3/13/2025   HCG, TOTAL, QN      mIU/mL 602 (H)       Monitoring patient: Letrozole 7.5 mg/TIC cycle, LMP on 2025, 4.6 wks yesterday, initial HCG Level  appropriate at 602. Patient to repeat HCG Level in 1 week and continue current medication protocol. Plan to be communicated by RN. Plan agreed upon by Dr. Eli. Meera Bautista, CNP 25 7:22 AM       Phone call to patient to discuss results of blood pregnancy test. Shared with patient that HCG level is 602 and they are 4 weeks and 6 days pregnant. Reviewed with patient that we are cautiously optimistic that  this is a good level. Reviewed with patient to continue all current medications and that we will plan to repeat level in 1 week. Reviewed with patient to reach out to their primary OBGYN to schedule new OB visit at this time as patient needs to be seen by 9 weeks gestation. She verbalizes understanding and is agreeable.    Ailyn De Leon 03/14/25 8:12 AM

## 2025-03-20 ENCOUNTER — LAB REQUISITION (OUTPATIENT)
Dept: LAB | Facility: HOSPITAL | Age: 30
End: 2025-03-20
Payer: COMMERCIAL

## 2025-03-20 DIAGNOSIS — Z32.01 ENCOUNTER FOR PREGNANCY TEST, RESULT POSITIVE (HHS-HCC): ICD-10-CM

## 2025-03-20 DIAGNOSIS — O36.80X0 ENCOUNTER TO DETERMINE FETAL VIABILITY OF PREGNANCY, NOT APPLICABLE OR UNSPECIFIED FETUS: ICD-10-CM

## 2025-03-20 LAB — B-HCG SERPL-ACNC: 6125 MIU/ML

## 2025-03-20 PROCEDURE — 36415 COLL VENOUS BLD VENIPUNCTURE: CPT

## 2025-03-20 PROCEDURE — 84702 CHORIONIC GONADOTROPIN TEST: CPT

## 2025-03-20 NOTE — PROGRESS NOTES
Repeat HCst  on 3-  Patient's EVONNE Provider: Mayte Eli MD  Infertility dx: Polycystic Ovarian Syndrome  Achieved pregnancy by: Timed intercourse  Fertility medications used this cycle: letrozole  LMP: Patient's last menstrual period was 25  EGA based on (IUI date, ET ): LMP: 5w6d     Updated G/P with this pregnancy:   OB HX:                                  OB History    Para Term  AB Living   1 1 1 0 0 1   SAB IAB Ectopic Multiple Live Births         0 0 0 0 1             # Outcome Date GA Lbr Shadi/2nd Weight Sex Type Anes PTL Lv   1 Term 21 38w0d     M Vag-Spont EPI N KATERINA      Complications: Shoulder Dystocia, Hemorrhage         Type & Screen: 2024  ABO: O  Rh: POS  Antibody: NEG  History of miscarriage: No  History of pelvic/abdominal surgeries: No  History of STDs/PID: No  Hx of ectopic: No  Hx of tubal disease/ blockage: No     Risk for ectopic: No        PNV: Yes  Vitamin D 2000 IUs: No  Luteal support: None  Additional medications: Vitamin C 500mg  Zyrtec 10mg  Inositol 1000mg ( recommend stopping with positive pregnancy test)     Patient going to outpatient lab for repeat bhcg following letrozole TIC cycle. Based off LMP patient would be 5w6d.    25 at 7:56 AM - Candice Colon LPN      Component      Latest Ref Rng 3/13/2025 3/20/2025   HCG, TOTAL, QN      mIU/mL 602 (H)     HCG, Beta-Quantitative      <5 mIU/mL  6,125 (H)       Monitoring patient: Letrozole 7.5 mg/TIC cycle, LMP on 2025, 6 wks, repeat HCG Level today with appropriate rise to 6,125 from 602 on 3-. Patient to do an OB Scan and continue current medication protocol. Plan to be communicated by RN. Plan agreed upon by Dr. Eli. Meera Bautista, BIANCA 25 4:48 PM     Called patient with results and plan to schedule OB scan for next Thursday. Patient aware FD is gone and will call  back tomorrow to schedule this.     Ailyn De Leon 25 4:32 PM

## 2025-03-27 ENCOUNTER — OFFICE VISIT (OUTPATIENT)
Dept: ENDOCRINOLOGY | Facility: CLINIC | Age: 30
End: 2025-03-27
Payer: COMMERCIAL

## 2025-03-27 ENCOUNTER — ANCILLARY PROCEDURE (OUTPATIENT)
Dept: ENDOCRINOLOGY | Facility: CLINIC | Age: 30
End: 2025-03-27
Payer: COMMERCIAL

## 2025-03-27 DIAGNOSIS — O36.80X0 ENCOUNTER TO DETERMINE FETAL VIABILITY OF PREGNANCY, NOT APPLICABLE OR UNSPECIFIED FETUS: ICD-10-CM

## 2025-03-27 DIAGNOSIS — O36.80X0 ENCOUNTER TO DETERMINE FETAL VIABILITY OF PREGNANCY, SINGLE OR UNSPECIFIED FETUS: Primary | ICD-10-CM

## 2025-03-27 PROCEDURE — 76817 TRANSVAGINAL US OBSTETRIC: CPT | Performed by: OBSTETRICS & GYNECOLOGY

## 2025-03-27 PROCEDURE — 99212 OFFICE O/P EST SF 10 MIN: CPT

## 2025-03-27 PROCEDURE — 99212 OFFICE O/P EST SF 10 MIN: CPT | Mod: 25

## 2025-03-27 PROCEDURE — 76817 TRANSVAGINAL US OBSTETRIC: CPT

## 2025-03-27 NOTE — PROGRESS NOTES
Visit Type: In Person  MD reviewed, Authorization status not noted.    OB Scan    Ectopic risk factor: no  PGTA/PGTM: no  Blood type: O+  Method of Conception: Letrozole 7.5 mg/TIC, LMP 2025, 7 wks    Reviewed results from OB ultrasound today and results reviewed with pt as follows:     OB Scan results:   Assessment LMP on 2025.  present. EMELIA: 2025. Gest. age 6 w + 6 d  Dating Date Details Gest. age EMELIA  LMP 2025: 6 w + 6 d, EMELIA 2025  Stated EMELIA 6 w + 6 d, EMELIA 2025  U/S 3/27/2025 based upon CRL 6 w + 4 d, EMELIA 2025  Assessment Gestational sac: visualized. Location: intrauterine  Yolk sac: visualized  Embryo: visualized  Cardiac activity: present  Early placenta: circumferential  YS 2.2 mm   CRL 5.3 mm 6w 4d   bpm  Size = dates    Detailed discussion with patient about her scan results, pregnancy, and next steps:    Plan:   Reviewed medications in detail. She will continue PNV.   Discussed with patient any pregnancy concerns and discussed establishing care with an OB. Has new OB visit scheduled with Dr. Bravo 2025, will try to get in sooner with another OB provider, list of names given.   Will provide recommendations if she desires.   Advised to call with bleeding, pain or concerns. Denies any pain, bleeding, or cramping.   C/O nausea, able to keep food/fluids down. encouraged to try Vitamin B 6 25 mg p.o. TID and if worsens can add in 1/2 Unisom tablet (12.5 mg). Patient to call back if without relief.       Ultrasound results and Plan of care reviewed with Dr. Dickerson    Intimate Exam Performed: No, an intimate exam was not performed at this encounter.     Meera Bautista CNP 25 1:32 PM

## 2025-04-30 PROBLEM — E55.9 VITAMIN D DEFICIENCY: Status: RESOLVED | Noted: 2023-10-16 | Resolved: 2025-04-30

## 2025-04-30 PROBLEM — Z87.59 HISTORY OF SHOULDER DYSTOCIA IN PRIOR PREGNANCY: Status: ACTIVE | Noted: 2025-04-30

## 2025-04-30 PROBLEM — Z87.59 HISTORY OF GESTATIONAL HYPERTENSION: Status: ACTIVE | Noted: 2025-04-30

## 2025-04-30 PROBLEM — N91.1 SECONDARY AMENORRHEA: Status: RESOLVED | Noted: 2023-10-16 | Resolved: 2025-04-30

## 2025-04-30 PROBLEM — Z87.59 HISTORY OF THIRD DEGREE PERINEAL LACERATION: Status: ACTIVE | Noted: 2025-04-30

## 2025-04-30 PROBLEM — G56.22 CUBITAL TUNNEL SYNDROME ON LEFT: Status: RESOLVED | Noted: 2023-10-16 | Resolved: 2025-04-30

## 2025-04-30 PROBLEM — Z87.59 HISTORY OF POSTPARTUM HEMORRHAGE: Status: ACTIVE | Noted: 2025-04-30

## 2025-04-30 PROBLEM — K21.9 GERD (GASTROESOPHAGEAL REFLUX DISEASE): Status: RESOLVED | Noted: 2023-10-16 | Resolved: 2025-04-30

## 2025-04-30 PROBLEM — M25.50 ARTHRALGIA OF MULTIPLE JOINTS: Status: RESOLVED | Noted: 2023-10-16 | Resolved: 2025-04-30

## 2025-04-30 PROBLEM — G56.01 CARPAL TUNNEL SYNDROME OF RIGHT WRIST: Status: RESOLVED | Noted: 2023-10-16 | Resolved: 2025-04-30

## 2025-04-30 PROBLEM — M65.4 DE QUERVAIN'S TENOSYNOVITIS: Status: RESOLVED | Noted: 2023-10-16 | Resolved: 2025-04-30

## 2025-04-30 PROBLEM — Z34.90 ENCOUNTER FOR PREGNANCY RELATED EXAMINATION, ANTEPARTUM: Status: ACTIVE | Noted: 2025-04-30

## 2025-05-01 ENCOUNTER — APPOINTMENT (OUTPATIENT)
Dept: LAB | Facility: HOSPITAL | Age: 30
End: 2025-05-01
Payer: COMMERCIAL

## 2025-05-01 ENCOUNTER — APPOINTMENT (OUTPATIENT)
Dept: OBSTETRICS AND GYNECOLOGY | Facility: CLINIC | Age: 30
End: 2025-05-01
Payer: COMMERCIAL

## 2025-05-01 VITALS — WEIGHT: 236 LBS | SYSTOLIC BLOOD PRESSURE: 124 MMHG | BODY MASS INDEX: 41.81 KG/M2 | DIASTOLIC BLOOD PRESSURE: 80 MMHG

## 2025-05-01 DIAGNOSIS — Z87.59 HISTORY OF POSTPARTUM HEMORRHAGE: ICD-10-CM

## 2025-05-01 DIAGNOSIS — Z87.59 HISTORY OF GESTATIONAL HYPERTENSION: ICD-10-CM

## 2025-05-01 DIAGNOSIS — Z87.59 HISTORY OF THIRD DEGREE PERINEAL LACERATION: ICD-10-CM

## 2025-05-01 DIAGNOSIS — Z87.59 HISTORY OF SHOULDER DYSTOCIA IN PRIOR PREGNANCY: ICD-10-CM

## 2025-05-01 DIAGNOSIS — Z34.90 ENCOUNTER FOR PREGNANCY RELATED EXAMINATION, ANTEPARTUM: Primary | ICD-10-CM

## 2025-05-01 LAB
ABO GROUP (TYPE) IN BLOOD: NORMAL
ANTIBODY SCREEN: NORMAL
ERYTHROCYTE [DISTWIDTH] IN BLOOD BY AUTOMATED COUNT: 12.9 % (ref 11.5–14.5)
HCT VFR BLD AUTO: 35.8 % (ref 36–46)
HGB BLD-MCNC: 11.9 G/DL (ref 12–16)
MCH RBC QN AUTO: 28.5 PG (ref 26–34)
MCHC RBC AUTO-ENTMCNC: 33.2 G/DL (ref 32–36)
MCV RBC AUTO: 86 FL (ref 80–100)
NRBC BLD-RTO: 0 /100 WBCS (ref 0–0)
PLATELET # BLD AUTO: 246 X10*3/UL (ref 150–450)
RBC # BLD AUTO: 4.17 X10*6/UL (ref 4–5.2)
REFLEX ADDED, ANEMIA PANEL: NORMAL
RH FACTOR (ANTIGEN D): NORMAL
WBC # BLD AUTO: 7.1 X10*3/UL (ref 4.4–11.3)

## 2025-05-01 PROCEDURE — 87626 HPV SEP HI-RSK TYP&POOL RSLT: CPT

## 2025-05-01 PROCEDURE — 88141 CYTOPATH C/V INTERPRET: CPT | Performed by: PATHOLOGY

## 2025-05-01 PROCEDURE — 86900 BLOOD TYPING SEROLOGIC ABO: CPT

## 2025-05-01 PROCEDURE — 87491 CHLMYD TRACH DNA AMP PROBE: CPT

## 2025-05-01 PROCEDURE — 85027 COMPLETE CBC AUTOMATED: CPT

## 2025-05-01 PROCEDURE — 86901 BLOOD TYPING SEROLOGIC RH(D): CPT

## 2025-05-01 PROCEDURE — 86850 RBC ANTIBODY SCREEN: CPT

## 2025-05-01 PROCEDURE — 83021 HEMOGLOBIN CHROMOTOGRAPHY: CPT

## 2025-05-01 PROCEDURE — 83020 HEMOGLOBIN ELECTROPHORESIS: CPT

## 2025-05-01 PROCEDURE — 88175 CYTOPATH C/V AUTO FLUID REDO: CPT

## 2025-05-01 PROCEDURE — 87591 N.GONORRHOEAE DNA AMP PROB: CPT

## 2025-05-01 PROCEDURE — 0500F INITIAL PRENATAL CARE VISIT: CPT | Performed by: OBSTETRICS & GYNECOLOGY

## 2025-05-01 RX ORDER — NAPROXEN SODIUM 220 MG/1
162 TABLET, FILM COATED ORAL DAILY
Qty: 180 TABLET | Refills: 2 | Status: SHIPPED | OUTPATIENT
Start: 2025-05-01 | End: 2026-05-01

## 2025-05-01 ASSESSMENT — EDINBURGH POSTNATAL DEPRESSION SCALE (EPDS)
I HAVE BEEN SO UNHAPPY THAT I HAVE HAD DIFFICULTY SLEEPING: NOT AT ALL
I HAVE BEEN SO UNHAPPY THAT I HAVE BEEN CRYING: NO, NEVER
TOTAL SCORE: 0
I HAVE FELT SCARED OR PANICKY FOR NO GOOD REASON: NO, NOT AT ALL
I HAVE LOOKED FORWARD WITH ENJOYMENT TO THINGS: AS MUCH AS I EVER DID
I HAVE BEEN ABLE TO LAUGH AND SEE THE FUNNY SIDE OF THINGS: AS MUCH AS I ALWAYS COULD
THE THOUGHT OF HARMING MYSELF HAS OCCURRED TO ME: NEVER
THINGS HAVE BEEN GETTING ON TOP OF ME: NO, I HAVE BEEN COPING AS WELL AS EVER
I HAVE FELT SAD OR MISERABLE: NO, NOT AT ALL
I HAVE BLAMED MYSELF UNNECESSARILY WHEN THINGS WENT WRONG: NO, NEVER
I HAVE BEEN ANXIOUS OR WORRIED FOR NO GOOD REASON: NO, NOT AT ALL

## 2025-05-01 NOTE — PROGRESS NOTES
Subjective   Patient ID [id number]   Kristi Jack is a 29 y.o.  at 11w5d with a working estimated date of delivery of 2025, by Last Menstrual Period who presents for an initial prenatal visit. This pregnancy is planned.    Her pregnancy is complicated by:  Conceived with Letrozole   H/o shoulder dystocia, GHTN and PPH    OB History    Para Term  AB Living   2 1 1 0 0 1   SAB IAB Ectopic Multiple Live Births   0 0 0 0 1      # Outcome Date GA Lbr Shadi/2nd Weight Sex Type Anes PTL Lv   2 Current            1 Term 21 39w4d   M Vag-Spont EPI N KATERINA      Complications: Shoulder Dystocia, Hemorrhage          Objective   Physical Exam     Expected Total Weight Gain: Could not be calculated   Pregravid BMI: Could not be calculated             Physical Exam  Constitutional:       Appearance: Normal appearance.   Genitourinary:      Vulva normal.   HENT:      Head: Atraumatic.   Eyes:      Extraocular Movements: Extraocular movements intact.      Conjunctiva/sclera: Conjunctivae normal.   Pulmonary:      Effort: Pulmonary effort is normal.   Abdominal:      Palpations: Abdomen is soft.   Musculoskeletal:         General: Normal range of motion.      Cervical back: Normal range of motion.   Neurological:      General: No focal deficit present.   Skin:     General: Skin is warm and dry.   Psychiatric:         Mood and Affect: Mood normal.       Assessment/Plan   Immunizations: Up to Date  Prenatal Labs ordered  Daily prenatal vitamins and BASA  First trimester screening and second trimester screening discussed. Patient decided to proceed with cfDNA screening.   Follow up in 4 weeks for return OB visit.

## 2025-05-02 ENCOUNTER — APPOINTMENT (OUTPATIENT)
Dept: LAB | Facility: HOSPITAL | Age: 30
End: 2025-05-02
Payer: COMMERCIAL

## 2025-05-02 LAB
EST. AVERAGE GLUCOSE BLD GHB EST-MCNC: 108 MG/DL
EST. AVERAGE GLUCOSE BLD GHB EST-SCNC: 6 MMOL/L
HBA1C MFR BLD: 5.4 %
HBV CORE AB SERPL QL IA: NORMAL
HBV SURFACE AB SERPL IA-ACNC: <5 MIU/ML
HBV SURFACE AG SERPL QL IA: NORMAL
HCV AB SERPL QL IA: NORMAL
HEMOGLOBIN A2: 3.1 % (ref 2–3.5)
HEMOGLOBIN A: 96.1 % (ref 95.8–98)
HEMOGLOBIN F: 0.8 % (ref 0–2)
HEMOGLOBIN IDENTIFICATION INTERPRETATION: NORMAL
HIV 1+2 AB+HIV1 P24 AG SERPL QL IA: NORMAL
PATH REVIEW-HGB IDENTIFICATION: NORMAL
RUBV IGG SERPL IA-ACNC: NORMAL
T PALLIDUM AB SER QL IA: NORMAL

## 2025-05-03 LAB
BACTERIA UR CULT: NORMAL
C TRACH RRNA SPEC QL NAA+PROBE: NEGATIVE
N GONORRHOEA DNA SPEC QL PROBE+SIG AMP: NEGATIVE

## 2025-05-05 ENCOUNTER — HOSPITAL ENCOUNTER (OUTPATIENT)
Dept: RADIOLOGY | Facility: HOSPITAL | Age: 30
Discharge: HOME | End: 2025-05-05
Payer: COMMERCIAL

## 2025-05-05 DIAGNOSIS — Z34.90 ENCOUNTER FOR PREGNANCY RELATED EXAMINATION, ANTEPARTUM: ICD-10-CM

## 2025-05-05 PROCEDURE — 76813 OB US NUCHAL MEAS 1 GEST: CPT

## 2025-05-05 PROCEDURE — 76813 OB US NUCHAL MEAS 1 GEST: CPT | Performed by: MEDICAL GENETICS

## 2025-05-05 PROCEDURE — 76801 OB US < 14 WKS SINGLE FETUS: CPT

## 2025-05-06 PROBLEM — Z28.39 RUBELLA NON-IMMUNE STATUS, ANTEPARTUM (HHS-HCC): Status: ACTIVE | Noted: 2025-05-06

## 2025-05-06 PROBLEM — O09.899 RUBELLA NON-IMMUNE STATUS, ANTEPARTUM (HHS-HCC): Status: ACTIVE | Noted: 2025-05-06

## 2025-05-06 LAB
EST. AVERAGE GLUCOSE BLD GHB EST-MCNC: 108 MG/DL
EST. AVERAGE GLUCOSE BLD GHB EST-SCNC: 6 MMOL/L
HBA1C MFR BLD: 5.4 %
HBV CORE AB SERPL QL IA: NORMAL
HBV SURFACE AB SERPL IA-ACNC: <5 MIU/ML
HBV SURFACE AG SERPL QL IA: NORMAL
HCV AB SERPL QL IA: NORMAL
HIV 1+2 AB+HIV1 P24 AG SERPL QL IA: NORMAL
RUBV IGG SERPL IA-ACNC: <0.9 INDEX
T PALLIDUM AB SER QL IA: NEGATIVE

## 2025-05-13 ENCOUNTER — TELEPHONE (OUTPATIENT)
Dept: OBSTETRICS AND GYNECOLOGY | Facility: CLINIC | Age: 30
End: 2025-05-13
Payer: COMMERCIAL

## 2025-05-13 NOTE — TELEPHONE ENCOUNTER
Spoke with mandy from [employer] and she gave her a fax number of [phone].    Kyleigh Painting MA

## 2025-05-13 NOTE — TELEPHONE ENCOUNTER
Kristi Jack called in stating that she called the lab because she hasn't received her results back for her Myraid testing and they told her that they have the tube but no paperwork for it. Patient wants to know if we can fax information over to them.    Kyleigh Painting MA

## 2025-05-14 LAB
CYTOLOGY CMNT CVX/VAG CYTO-IMP: NORMAL
HPV HR 12 DNA GENITAL QL NAA+PROBE: NEGATIVE
HPV HR GENOTYPES PNL CVX NAA+PROBE: NEGATIVE
HPV16 DNA SPEC QL NAA+PROBE: NEGATIVE
HPV18 DNA SPEC QL NAA+PROBE: NEGATIVE
LAB AP HPV GENOTYPE QUESTION: YES
LAB AP HPV HR: NORMAL
LAB AP PAP ADDITIONAL TESTS: NORMAL
LABORATORY COMMENT REPORT: NORMAL
LMP START DATE: NORMAL
PATH REPORT.TOTAL CANCER: NORMAL

## 2025-05-15 LAB
COMMENTS - MP RESULT TYPE: NORMAL
SCAN RESULT: NORMAL

## 2025-06-05 ENCOUNTER — APPOINTMENT (OUTPATIENT)
Dept: OBSTETRICS AND GYNECOLOGY | Facility: CLINIC | Age: 30
End: 2025-06-05
Payer: COMMERCIAL

## 2025-06-05 VITALS — DIASTOLIC BLOOD PRESSURE: 82 MMHG | WEIGHT: 237 LBS | BODY MASS INDEX: 41.98 KG/M2 | SYSTOLIC BLOOD PRESSURE: 119 MMHG

## 2025-06-05 DIAGNOSIS — Z34.90 ENCOUNTER FOR PREGNANCY RELATED EXAMINATION, ANTEPARTUM: ICD-10-CM

## 2025-06-05 DIAGNOSIS — Z28.39 RUBELLA NON-IMMUNE STATUS, ANTEPARTUM (HHS-HCC): Primary | ICD-10-CM

## 2025-06-05 DIAGNOSIS — Z87.59 HISTORY OF SHOULDER DYSTOCIA IN PRIOR PREGNANCY: ICD-10-CM

## 2025-06-05 DIAGNOSIS — O09.899 RUBELLA NON-IMMUNE STATUS, ANTEPARTUM (HHS-HCC): Primary | ICD-10-CM

## 2025-06-05 DIAGNOSIS — Z87.59 HISTORY OF GESTATIONAL HYPERTENSION: ICD-10-CM

## 2025-06-05 DIAGNOSIS — Z87.59 HISTORY OF THIRD DEGREE PERINEAL LACERATION: ICD-10-CM

## 2025-06-05 DIAGNOSIS — Z87.59 HISTORY OF POSTPARTUM HEMORRHAGE: ICD-10-CM

## 2025-06-05 PROCEDURE — 0501F PRENATAL FLOW SHEET: CPT | Performed by: OBSTETRICS & GYNECOLOGY

## 2025-06-06 ENCOUNTER — PATIENT MESSAGE (OUTPATIENT)
Dept: OBSTETRICS AND GYNECOLOGY | Facility: CLINIC | Age: 30
End: 2025-06-06
Payer: COMMERCIAL

## 2025-06-06 DIAGNOSIS — K21.00 GASTROESOPHAGEAL REFLUX DISEASE WITH ESOPHAGITIS WITHOUT HEMORRHAGE: Primary | ICD-10-CM

## 2025-06-09 RX ORDER — LANSOPRAZOLE 30 MG/1
30 CAPSULE, DELAYED RELEASE ORAL
Qty: 30 CAPSULE | Refills: 11 | Status: SHIPPED | OUTPATIENT
Start: 2025-06-09 | End: 2026-06-09

## 2025-06-30 NOTE — PROGRESS NOTES
Assessment/Plan   Problem List Items Addressed This Visit       Polycystic ovarian syndrome    Overview   +hirsutism, +oligomenorrhea  -Based on [place] criteria.  Discussed weight loss and optimizing weight prior to pregnancy.  Discussed insulin resistance and need for regular diabetes screening.  Declines metformin and referral to weight loss management.   -Aware of need for withdrawal bleed every 3 months for endometrial protection             BMI 40.0-44.9, adult (Multi) - Primary    Infertility associated with anovulation    Overview   PCOS  Referral to EVONNE  Declines referral to weight loss management.         History of shoulder dystocia in prior pregnancy    Overview   Discussed option for PLTCS, hoping for vaginal delivery.           Encounter for pregnancy related examination, antepartum    Overview     [x] Accepts Blood Products: Yes  [x] Initial BMI: 42  [x] Prenatal Labs: RNI, otherwise normal  [x] Last pap: 2025 Negative, negative HPV  [x] Genetic Screening:  rrcfDNA screen 46XX, NT wnl  [x] Fetal Sex: It's a girl!  [x] Baby ASA: Yes  [x] Pregnancy dated by: LMP c/w 6w US    [] Anatomy US:   [] 1hr GCT at 24-28wks:  [x] Rhogam: O Positive  [] Tdap Vaccine:  [] RSV (32-36 wks Sep-):   [] Flu Vaccine:    [] GBS at 36 - 37 wks:  [] Labor preferences:  [x] 39 weeks discussion of IOL vs. Expectant management: Strongly desires spontaneous labor  [] Mode of delivery ( anticipated ):   [] Breastfeeding:  [] Postpartum Birth control method:                  History of postpartum hemorrhage    Overview   Type and cross at delivery   after  received 2uPRBCs, IV iron         History of third degree perineal laceration    History of gestational hypertension    Overview    Diagnosed during admission for delivery         Rubella non-immune status, antepartum ([facility])    Overview   Vaccinate postpartum                 Reviewed s/sx of PTL, warning signs, fetal movement counts, and when to call  provider  Follow up in 4 weeks for a routine prenatal visit.    Barbara Ann, CHAO-SUDHIR    Subjective     Kristi Jack is a 29 y.o.  at 20w4d with a working estimated date of delivery of 2025, by Last Menstrual Period who presents for a routine prenatal visit.     Vaginal bleeding no  Leakage of fluid  no  Decreased fetal movements slight fluttering appropriate for GA  Contractions no      Postpartum Depression: Low Risk  (2025)    [place]  Depression Scale     Last EPDS Total Score: 0     Last EPDS Self Harm Result: Never       Prenatal Labs  Lab Results   Component Value Date    HGB 11.9 (L) 2025    HCT 35.8 (L) 2025     2025    ABO O 2025    LABRH POS 2025    NEISSGONOAMP Negative 2025    CHLAMTRACAMP Negative 2025    SYPHT Negative 2025    HEPBSAG NON-REACTIVE 2025    HIV1X2 NON-REACTIVE 2025    URINECULTURE SEE NOTE 2025    GLUC1P 100 2021       Education provided:    The Midwifery Service at [facility] has a Certified Nurse Midwife on call  who is available to discuss any pregnancy related concerns or urgent needs that cannot wait until the next business day.  Please call the office where you are seen at during the day.  On call numbers for after hours are listed below.    At any time you would like to tour our facilities, please call [phone] to set up a tour    If you are  (less than 37) weeks and experience:     More than 5 contractions in an 1 hour period   If you have fluid leaking from your vagina   Bleeding that is as heavy as a period   Decreased fetal movements or changes in your baby's movement after 24 weeks   A headache that does not go away with Tylenol or rest    If you full term (37 weeks or greater) and experience:     Contractions that are 5 minutes a part for 2 hours that you cannot walk or talk through   A gush of fluid from your vagina   Bleeding that is as  heavy as a period   Decrease fetal movements or changes in your baby's movements   A headache that does not go away with Tylenol or rest     During the weekday office hours please call the office you are normally seen at.    After hours please call:  For patients planning on birthing at [facility] or [facility]) and need to speak to the midwife on call:  [phone]    For patients planning on birthing at [facility] and need to speak to the the midwife on call:  [phone]      DO NOT USE QUIQ MESSAGES - THEY ARE NOT MONITORED 24/7

## 2025-07-01 ENCOUNTER — HOSPITAL ENCOUNTER (OUTPATIENT)
Dept: RADIOLOGY | Facility: CLINIC | Age: 30
Discharge: HOME | End: 2025-07-01
Payer: COMMERCIAL

## 2025-07-01 ENCOUNTER — APPOINTMENT (OUTPATIENT)
Dept: OBSTETRICS AND GYNECOLOGY | Facility: CLINIC | Age: 30
End: 2025-07-01
Payer: COMMERCIAL

## 2025-07-01 VITALS — DIASTOLIC BLOOD PRESSURE: 76 MMHG | BODY MASS INDEX: 42.51 KG/M2 | WEIGHT: 240 LBS | SYSTOLIC BLOOD PRESSURE: 122 MMHG

## 2025-07-01 DIAGNOSIS — Z87.59 HISTORY OF POSTPARTUM HEMORRHAGE: ICD-10-CM

## 2025-07-01 DIAGNOSIS — Z87.59 HISTORY OF GESTATIONAL HYPERTENSION: ICD-10-CM

## 2025-07-01 DIAGNOSIS — O09.899 RUBELLA NON-IMMUNE STATUS, ANTEPARTUM (HHS-HCC): ICD-10-CM

## 2025-07-01 DIAGNOSIS — Z87.59 HISTORY OF THIRD DEGREE PERINEAL LACERATION: ICD-10-CM

## 2025-07-01 DIAGNOSIS — Z87.59 HISTORY OF SHOULDER DYSTOCIA IN PRIOR PREGNANCY: ICD-10-CM

## 2025-07-01 DIAGNOSIS — Z28.39 RUBELLA NON-IMMUNE STATUS, ANTEPARTUM (HHS-HCC): ICD-10-CM

## 2025-07-01 DIAGNOSIS — E28.2 POLYCYSTIC OVARIAN SYNDROME: ICD-10-CM

## 2025-07-01 DIAGNOSIS — Z34.90 ENCOUNTER FOR PREGNANCY RELATED EXAMINATION, ANTEPARTUM: ICD-10-CM

## 2025-07-01 DIAGNOSIS — N97.0 INFERTILITY ASSOCIATED WITH ANOVULATION: ICD-10-CM

## 2025-07-01 PROCEDURE — 76811 OB US DETAILED SNGL FETUS: CPT

## 2025-07-01 PROCEDURE — 0501F PRENATAL FLOW SHEET: CPT | Performed by: ADVANCED PRACTICE MIDWIFE

## 2025-07-01 PROCEDURE — 76811 OB US DETAILED SNGL FETUS: CPT | Performed by: OBSTETRICS & GYNECOLOGY

## 2025-07-17 ENCOUNTER — HOSPITAL ENCOUNTER (OUTPATIENT)
Dept: RADIOLOGY | Facility: CLINIC | Age: 30
Discharge: HOME | End: 2025-07-17
Payer: COMMERCIAL

## 2025-07-17 DIAGNOSIS — Z34.90 ENCOUNTER FOR PREGNANCY RELATED EXAMINATION, ANTEPARTUM: ICD-10-CM

## 2025-07-17 PROCEDURE — 76816 OB US FOLLOW-UP PER FETUS: CPT

## 2025-07-17 PROCEDURE — 76816 OB US FOLLOW-UP PER FETUS: CPT | Performed by: OBSTETRICS & GYNECOLOGY

## 2025-08-04 ENCOUNTER — APPOINTMENT (OUTPATIENT)
Dept: OBSTETRICS AND GYNECOLOGY | Facility: CLINIC | Age: 30
End: 2025-08-04
Payer: COMMERCIAL

## 2025-08-04 VITALS — BODY MASS INDEX: 44.11 KG/M2 | WEIGHT: 249 LBS | DIASTOLIC BLOOD PRESSURE: 73 MMHG | SYSTOLIC BLOOD PRESSURE: 111 MMHG

## 2025-08-04 DIAGNOSIS — Z3A.25 25 WEEKS GESTATION OF PREGNANCY (HHS-HCC): ICD-10-CM

## 2025-08-04 DIAGNOSIS — Z34.90 ENCOUNTER FOR PREGNANCY RELATED EXAMINATION, ANTEPARTUM: Primary | ICD-10-CM

## 2025-08-04 PROCEDURE — 0501F PRENATAL FLOW SHEET: CPT | Performed by: ADVANCED PRACTICE MIDWIFE

## 2025-08-04 ASSESSMENT — EDINBURGH POSTNATAL DEPRESSION SCALE (EPDS)
I HAVE BEEN SO UNHAPPY THAT I HAVE BEEN CRYING: NO, NEVER
I HAVE FELT SAD OR MISERABLE: NO, NOT AT ALL
I HAVE LOOKED FORWARD WITH ENJOYMENT TO THINGS: AS MUCH AS I EVER DID
TOTAL SCORE: 1
THE THOUGHT OF HARMING MYSELF HAS OCCURRED TO ME: NEVER
THINGS HAVE BEEN GETTING ON TOP OF ME: NO, I HAVE BEEN COPING AS WELL AS EVER
I HAVE FELT SCARED OR PANICKY FOR NO GOOD REASON: NO, NOT AT ALL
I HAVE BLAMED MYSELF UNNECESSARILY WHEN THINGS WENT WRONG: NOT VERY OFTEN
I HAVE BEEN SO UNHAPPY THAT I HAVE HAD DIFFICULTY SLEEPING: NOT AT ALL
I HAVE BEEN ANXIOUS OR WORRIED FOR NO GOOD REASON: NO, NOT AT ALL
I HAVE BEEN ABLE TO LAUGH AND SEE THE FUNNY SIDE OF THINGS: AS MUCH AS I ALWAYS COULD

## 2025-08-04 ASSESSMENT — ENCOUNTER SYMPTOMS
PSYCHIATRIC NEGATIVE: 0
NEUROLOGICAL NEGATIVE: 0
CONSTITUTIONAL NEGATIVE: 0
ENDOCRINE NEGATIVE: 0
EYES NEGATIVE: 0
RESPIRATORY NEGATIVE: 0
MUSCULOSKELETAL NEGATIVE: 0
ALLERGIC/IMMUNOLOGIC NEGATIVE: 0
CARDIOVASCULAR NEGATIVE: 0
HEMATOLOGIC/LYMPHATIC NEGATIVE: 0
GASTROINTESTINAL NEGATIVE: 0

## 2025-08-04 NOTE — PROGRESS NOTES
Ob Follow-up  25     SUBJECTIVE      HPI: Kristi Jack is a 29 y.o.  at 25w3d here for RPNV.  She denies LOF, VB, ctxs, endorses fetal movement.      OBJECTIVE  Visit Vitals  /73   Wt 113 kg (249 lb)   LMP 2025   BMI 44.11 kg/m²   OB Status Pregnant   Smoking Status Never   BSA 2.24 m²      ASSESSMENT & PLAN    Kristi Jack is a 29 y.o.  at 25w3d here for the following concerns we addressed today:    Problem List Items Addressed This Visit       Encounter for pregnancy related examination, antepartum - Primary    Overview     [x] Accepts Blood Products: Yes  [x] Initial BMI: 42  [x] Prenatal Labs: RNI, otherwise normal  [x] Last pap: 2025 Negative, negative HPV  [x] Genetic Screening:  rrcfDNA screen 46XX, NT wnl  [x] Fetal Sex: It's a girl!  [x] Baby ASA: Yes  [x] Pregnancy dated by: LMP c/w 6w US    [x] Anatomy US: Normal  [] 1hr GCT at 24-28wks:  [x] Rhogam: O Positive  [] Tdap Vaccine:  [] RSV (32-36 wks Sep-):   [] Flu Vaccine:    [] GBS at 36 - 37 wks:  [] Labor preferences:  [x] 39 weeks discussion of IOL vs. Expectant management: Strongly desires spontaneous labor  [] Mode of delivery ( anticipated ):   [x] Breastfeeding:  [] Postpartum Birth control method:                  Relevant Orders    CBC Anemia Panel With Reflex,Pregnancy    Glucose, 1 Hour Screen, Pregnancy    Syphilis Screen with Reflex     Other Visit Diagnoses         25 weeks gestation of pregnancy ([facility])        Relevant Orders    CBC Anemia Panel With Reflex,Pregnancy    Glucose, 1 Hour Screen, Pregnancy    Syphilis Screen with Reflex              Orders Placed This Encounter   Procedures    CBC Anemia Panel With Reflex,Pregnancy     Standing Status:   Future     Number of Occurrences:   1     Expected Date:   2025     Expiration Date:   2026     Release result to [employer]:   Immediate     [facility] Carveout?:   CARVEOUT: SEND TO [facility]    Glucose, 1 Hour Screen, Pregnancy     Standing Status:   Future      Number of Occurrences:   1     Expected Date:   8/4/2025     Expiration Date:   8/4/2026     Release result to MyChart:   Immediate    Syphilis Screen with Reflex     Standing Status:   Future     Number of Occurrences:   1     Expected Date:   8/4/2025     Expiration Date:   8/4/2026     [facility]   Immediate [1]      Return precautions reviewed.   RTC in 2-4 weeks      CHAO Rojo-SUDHIR

## 2025-08-05 ENCOUNTER — APPOINTMENT (OUTPATIENT)
Dept: OBSTETRICS AND GYNECOLOGY | Facility: CLINIC | Age: 30
End: 2025-08-05
Payer: COMMERCIAL

## 2025-08-08 ENCOUNTER — APPOINTMENT (OUTPATIENT)
Dept: OBSTETRICS AND GYNECOLOGY | Facility: CLINIC | Age: 30
End: 2025-08-08
Payer: COMMERCIAL

## 2025-08-18 ENCOUNTER — LAB (OUTPATIENT)
Dept: LAB | Facility: HOSPITAL | Age: 30
End: 2025-08-18
Payer: COMMERCIAL

## 2025-08-18 LAB
ERYTHROCYTE [DISTWIDTH] IN BLOOD BY AUTOMATED COUNT: 14.1 % (ref 11.5–14.5)
FERRITIN SERPL-MCNC: 11 NG/ML
FOLATE SERPL-MCNC: 20.1 NG/ML
HCT VFR BLD AUTO: 33.1 % (ref 36–46)
HGB BLD-MCNC: 10.3 G/DL (ref 12–16)
IRON SATN MFR SERPL: 10 %
IRON SERPL-MCNC: 51 UG/DL
MCH RBC QN AUTO: 28.2 PG (ref 26–34)
MCHC RBC AUTO-ENTMCNC: 31.1 G/DL (ref 32–36)
MCV RBC AUTO: 91 FL (ref 80–100)
NRBC BLD-RTO: 0 /100 WBCS (ref 0–0)
PLATELET # BLD AUTO: 245 X10*3/UL (ref 150–450)
RBC # BLD AUTO: 3.65 X10*6/UL (ref 4–5.2)
REFLEX ADDED, ANEMIA PANEL: NORMAL
TIBC SERPL-MCNC: 499 UG/DL
UIBC SERPL-MCNC: 448 UG/DL
VIT B12 SERPL-MCNC: 349 PG/ML
WBC # BLD AUTO: 8.7 X10*3/UL (ref 4.4–11.3)

## 2025-08-18 PROCEDURE — 82607 VITAMIN B-12: CPT

## 2025-08-18 PROCEDURE — 83550 IRON BINDING TEST: CPT

## 2025-08-18 PROCEDURE — 85027 COMPLETE CBC AUTOMATED: CPT

## 2025-08-18 PROCEDURE — 82746 ASSAY OF FOLIC ACID SERUM: CPT

## 2025-08-18 PROCEDURE — 82728 ASSAY OF FERRITIN: CPT

## 2025-08-21 LAB
GLUCOSE 1H P 50 G GLC PO SERPL-MCNC: 164 MG/DL
T PALLIDUM AB SER QL IA: NEGATIVE

## 2025-08-30 PROBLEM — O99.013 ANEMIA IN PREGNANCY, THIRD TRIMESTER: Status: ACTIVE | Noted: 2025-08-30

## 2025-09-02 PROBLEM — N97.0 INFERTILITY ASSOCIATED WITH ANOVULATION: Status: RESOLVED | Noted: 2024-07-18 | Resolved: 2025-09-02

## 2025-09-04 ENCOUNTER — APPOINTMENT (OUTPATIENT)
Dept: OBSTETRICS AND GYNECOLOGY | Facility: CLINIC | Age: 30
End: 2025-09-04
Payer: COMMERCIAL

## 2025-09-18 ENCOUNTER — APPOINTMENT (OUTPATIENT)
Dept: OBSTETRICS AND GYNECOLOGY | Facility: CLINIC | Age: 30
End: 2025-09-18
Payer: COMMERCIAL

## 2025-09-30 ENCOUNTER — APPOINTMENT (OUTPATIENT)
Dept: OBSTETRICS AND GYNECOLOGY | Facility: CLINIC | Age: 30
End: 2025-09-30
Payer: COMMERCIAL

## 2025-10-14 ENCOUNTER — APPOINTMENT (OUTPATIENT)
Dept: OBSTETRICS AND GYNECOLOGY | Facility: CLINIC | Age: 30
End: 2025-10-14
Payer: COMMERCIAL

## 2025-10-16 ENCOUNTER — APPOINTMENT (OUTPATIENT)
Dept: OBSTETRICS AND GYNECOLOGY | Facility: CLINIC | Age: 30
End: 2025-10-16
Payer: COMMERCIAL

## 2025-10-30 ENCOUNTER — APPOINTMENT (OUTPATIENT)
Dept: OBSTETRICS AND GYNECOLOGY | Facility: CLINIC | Age: 30
End: 2025-10-30
Payer: COMMERCIAL

## 2025-11-06 ENCOUNTER — APPOINTMENT (OUTPATIENT)
Dept: OBSTETRICS AND GYNECOLOGY | Facility: CLINIC | Age: 30
End: 2025-11-06
Payer: COMMERCIAL

## 2025-11-13 ENCOUNTER — APPOINTMENT (OUTPATIENT)
Dept: OBSTETRICS AND GYNECOLOGY | Facility: CLINIC | Age: 30
End: 2025-11-13
Payer: COMMERCIAL

## 2025-11-20 ENCOUNTER — APPOINTMENT (OUTPATIENT)
Dept: OBSTETRICS AND GYNECOLOGY | Facility: CLINIC | Age: 30
End: 2025-11-20
Payer: COMMERCIAL